# Patient Record
Sex: FEMALE | Race: WHITE | Employment: OTHER | URBAN - METROPOLITAN AREA
[De-identification: names, ages, dates, MRNs, and addresses within clinical notes are randomized per-mention and may not be internally consistent; named-entity substitution may affect disease eponyms.]

---

## 2017-03-31 ENCOUNTER — TELEPHONE (OUTPATIENT)
Dept: OBGYN CLINIC | Facility: CLINIC | Age: 67
End: 2017-03-31

## 2017-03-31 DIAGNOSIS — Z85.3 HISTORY OF RIGHT BREAST CANCER: Primary | ICD-10-CM

## 2017-03-31 NOTE — TELEPHONE ENCOUNTER
Pts last annual 4/22/16. Pt had mammo done on same day as annual last year because it is convenient for her to have mammo and annual done on same day since she lives in [place].  Pt asking if DAWSON can place mammo order now so that she can scheduled mammo on 5/

## 2017-03-31 NOTE — TELEPHONE ENCOUNTER
PT WANT TO KNOW IF SHE CAN GET AN ORDER FOR HER MAMMO TEST ON THE SAME DATE OF HER EXAM WHICH 05/10/17 / BECAUSE OF WHERE SHE LIVE / PT ALSO WANT TO KNOW WHEN HER LAST MAMMO TEST WAS / SHE WOULD LIKE A CALL BACK WITH THAT INFO / PLS ADV

## 2017-04-04 ENCOUNTER — OFFICE VISIT (OUTPATIENT)
Dept: DERMATOLOGY CLINIC | Facility: CLINIC | Age: 67
End: 2017-04-04

## 2017-04-04 DIAGNOSIS — L28.1 PICKER'S NODULES: Primary | ICD-10-CM

## 2017-04-04 DIAGNOSIS — D23.60 BENIGN NEOPLASM OF SKIN OF UPPER LIMB, INCLUDING SHOULDER, UNSPECIFIED LATERALITY: ICD-10-CM

## 2017-04-04 DIAGNOSIS — L82.1 SEBORRHEIC KERATOSES: ICD-10-CM

## 2017-04-04 DIAGNOSIS — D23.30 BENIGN NEOPLASM OF SKIN OF FACE: ICD-10-CM

## 2017-04-04 DIAGNOSIS — Z85.828 PERSONAL HISTORY OF OTHER MALIGNANT NEOPLASM OF SKIN: ICD-10-CM

## 2017-04-04 DIAGNOSIS — L81.4 SOLAR LENTIGO: ICD-10-CM

## 2017-04-04 DIAGNOSIS — D23.4 BENIGN NEOPLASM OF SCALP AND SKIN OF NECK: ICD-10-CM

## 2017-04-04 DIAGNOSIS — D23.70 BENIGN NEOPLASM OF SKIN OF LOWER LIMB, INCLUDING HIP, UNSPECIFIED LATERALITY: ICD-10-CM

## 2017-04-04 DIAGNOSIS — D23.5 BENIGN NEOPLASM OF SKIN OF TRUNK, EXCEPT SCROTUM: ICD-10-CM

## 2017-04-04 PROCEDURE — 17110 DESTRUCTION B9 LES UP TO 14: CPT | Performed by: DERMATOLOGY

## 2017-04-04 PROCEDURE — 99213 OFFICE O/P EST LOW 20 MIN: CPT | Performed by: DERMATOLOGY

## 2017-04-04 NOTE — PROGRESS NOTES
Past Medical History   Diagnosis Date   • H/O partial thyroidectomy      thyroid nodule   • Squamous cell carcinoma ([facility] [address] 2014     left anterior neck   • Cardiomyopathy [facility])      medication   • Breast cancer ([facility] 1991     right mastectomy   • Thyroid nod

## 2017-04-04 NOTE — TELEPHONE ENCOUNTER
ck'g on status of call; pt now lives further; w-like mammo order w-doppler if needed in order have brown't on same day she's coming in for annual  5-10-17  Please advise

## 2017-04-04 NOTE — PROGRESS NOTES
HPI:     Chief Complaint     Skin Cancer        HPI     Skin Cancer    Additional comments: pt here for one year f/u full body exam, h/o SCC       Last edited by Joseph Lloyd RN on 4/4/2017 11:11 AM. (History)          Patient notes some spots on the arm Appendectomy   • History of Papanicolaou smear of cervix 11/20/2013     per NG   • Osteoporosis screening 10/15/2008     per NG   • Menopause      age 35   • Osteopenia      dexa 2001   • Migraine headache    • Cardiovascular disorder 9/2010     ct angio are scattered blotchy brown macules on face, trunk and extremities  - there are  3 areas of erythema and hyperkeratosis with some nodularity on the right forearm and upper arm  - there are some cherry red papules  - there are numerous brown stuck on kerato

## 2017-05-10 ENCOUNTER — OFFICE VISIT (OUTPATIENT)
Dept: OBGYN CLINIC | Facility: CLINIC | Age: 67
End: 2017-05-10

## 2017-05-10 ENCOUNTER — HOSPITAL ENCOUNTER (OUTPATIENT)
Dept: MAMMOGRAPHY | Facility: HOSPITAL | Age: 67
Discharge: HOME | End: 2017-05-10
Attending: OBSTETRICS & GYNECOLOGY
Payer: MEDICARE

## 2017-05-10 VITALS
WEIGHT: 165 LBS | BODY MASS INDEX: 29.23 KG/M2 | DIASTOLIC BLOOD PRESSURE: 74 MMHG | HEIGHT: 63 IN | HEART RATE: 64 BPM | SYSTOLIC BLOOD PRESSURE: 117 MMHG

## 2017-05-10 DIAGNOSIS — Z85.3 HISTORY OF RIGHT BREAST CANCER: ICD-10-CM

## 2017-05-10 DIAGNOSIS — N95.2 ATROPHIC VAGINITIS: ICD-10-CM

## 2017-05-10 DIAGNOSIS — M85.80 OSTEOPENIA, UNSPECIFIED LOCATION: ICD-10-CM

## 2017-05-10 DIAGNOSIS — N95.1 MENOPAUSAL AND FEMALE CLIMACTERIC STATES: Primary | ICD-10-CM

## 2017-05-10 PROCEDURE — 99214 OFFICE O/P EST MOD 30 MIN: CPT | Performed by: OBSTETRICS & GYNECOLOGY

## 2017-05-10 PROCEDURE — 77065 DX MAMMO INCL CAD UNI: CPT | Performed by: OBSTETRICS & GYNECOLOGY

## 2017-05-18 NOTE — PROGRESS NOTES
HPI:    Patient ID: Greg Rivera is a 69year old female. HPI G3 [id number] and . No complaints. Nicholas Garcia moved to [facility]. Sai Barrow and her  had purchased a place in [place]. She's looking for a PCP closer to home.      Review of Systems wheezes. She has no rales. Right breast surgically absent and Left breast without skin / nipple changes, mass, tenderness or axillary adenopathy. Abdominal: Soft. She exhibits no distension and no mass. There is no tenderness.  There is no rebound and

## 2017-12-07 ENCOUNTER — TELEPHONE (OUTPATIENT)
Dept: OBGYN CLINIC | Facility: CLINIC | Age: 67
End: 2017-12-07

## 2017-12-07 NOTE — TELEPHONE ENCOUNTER
PER PT REQUESTING AN ORDER FOR BREST PROSTHESIS / BARS / PT WOULD ALSO LIKE TO SPEAK WITH A NURSE / PLS ADV

## 2017-12-07 NOTE — TELEPHONE ENCOUNTER
Pt calling to report that she needs a rx for right breast prosthesis and rx for 3 bras. Pt stated that she usually gets prothesis from [facility].  Pt stated that RX can be mailed to her home address and she will bring RX to [facility] and they will send info

## 2017-12-13 NOTE — TELEPHONE ENCOUNTER
PT NOTIFIED WE WILL CHECK WITH DAWSON WHILE HE IS IN THE OFFICE THIS AFTERNOON.   SHE NEEDS THE PRESCRIPTION TO SAY RIGHT BREAST PROSTHESIS AND 3 BRAS FOR THE PROSTHETIC.  WAS TOLD BY [facility] THAT AS LONG AS SHE BRINGS IN A PRESCRIPTION MEDICARE WILL PAY F

## 2018-03-01 ENCOUNTER — TELEPHONE (OUTPATIENT)
Dept: OBGYN CLINIC | Facility: CLINIC | Age: 68
End: 2018-03-01

## 2018-03-01 DIAGNOSIS — Z85.3 HISTORY OF BREAST CANCER: Primary | ICD-10-CM

## 2018-03-01 NOTE — TELEPHONE ENCOUNTER
MESSAGE REVIEWED WITH DAWSON OVER THE PHONE AND HE AGREED TO MAMMO ORDER. ORDER PLACED. CALLED AND INFORMED PT'S  THE ORDER IS IN AND THAT PT HAS THE PHONE NUMBER FOR CENTRAL SCHEDULING.

## 2018-03-01 NOTE — TELEPHONE ENCOUNTER
PT LIVES IN [place] SO WOULD LIKE TO SCHEDULE HER MAMMO THE SAME DAY AS HER ANNUAL EXAM ON 5-21-18. NEEDS LEFT BREAST DIAGNOSTIC (ONLY HAS LEFT BREAST DUE TO CANCER). PT SAID WE CAN LEAVE A MESSAGE ON HER VOICE MAIL IF SHE DOES NOT .

## 2018-05-14 ENCOUNTER — OFFICE VISIT (OUTPATIENT)
Dept: DERMATOLOGY CLINIC | Facility: CLINIC | Age: 68
End: 2018-05-14

## 2018-05-14 DIAGNOSIS — L82.1 SEBORRHEIC KERATOSES: ICD-10-CM

## 2018-05-14 DIAGNOSIS — L81.4 SOLAR LENTIGO: ICD-10-CM

## 2018-05-14 DIAGNOSIS — L82.0 INFLAMED SEBORRHEIC KERATOSIS: Primary | ICD-10-CM

## 2018-05-14 DIAGNOSIS — Z85.828 PERSONAL HISTORY OF SKIN CANCER: ICD-10-CM

## 2018-05-14 DIAGNOSIS — D23.70 BENIGN NEOPLASM OF SKIN OF LOWER LIMB, INCLUDING HIP, UNSPECIFIED LATERALITY: ICD-10-CM

## 2018-05-14 DIAGNOSIS — D23.60 BENIGN NEOPLASM OF SKIN OF UPPER LIMB, INCLUDING SHOULDER, UNSPECIFIED LATERALITY: ICD-10-CM

## 2018-05-14 DIAGNOSIS — D23.4 BENIGN NEOPLASM OF SCALP AND SKIN OF NECK: ICD-10-CM

## 2018-05-14 DIAGNOSIS — D23.5 BENIGN NEOPLASM OF SKIN OF TRUNK, EXCEPT SCROTUM: ICD-10-CM

## 2018-05-14 DIAGNOSIS — D23.30 BENIGN NEOPLASM OF SKIN OF FACE: ICD-10-CM

## 2018-05-14 PROCEDURE — 17110 DESTRUCTION B9 LES UP TO 14: CPT | Performed by: DERMATOLOGY

## 2018-05-14 PROCEDURE — 99213 OFFICE O/P EST LOW 20 MIN: CPT | Performed by: DERMATOLOGY

## 2018-05-14 RX ORDER — LEVOTHYROXINE SODIUM 112 UG/1
112 TABLET ORAL
COMMUNITY

## 2018-05-14 NOTE — PROGRESS NOTES
HPI:     Chief Complaint     Lesion        HPI     Lesion    Additional comments: Pt presents today for full body exam. Hx of SCC       Last edited by Alvarez Samson, Julio Cesar Marc on 5/14/2018  3:08 PM. (History)          Patient states that she had something on he • Osteopenia     dexa 2001   • Osteoporosis screening 10/15/2008    per NG   • Squamous cell carcinoma 2014    left anterior neck   • Thyroid nodule     Partial Thyroidectomy;  Thyroid nodule removed 1994     Past Surgical History:  No date: APPENDECTOMY extremities  - there is an inflamed slightly scabbed brown and pink 5 mm keratosis on the outer upper left arm.  - there are some cherry red papules  - there are numerous brown stuck on keratoses.   The lesion that had acted up on the back is now just a lig

## 2018-05-21 ENCOUNTER — OFFICE VISIT (OUTPATIENT)
Dept: OBGYN CLINIC | Facility: CLINIC | Age: 68
End: 2018-05-21

## 2018-05-21 ENCOUNTER — HOSPITAL ENCOUNTER (OUTPATIENT)
Dept: MAMMOGRAPHY | Facility: HOSPITAL | Age: 68
Discharge: HOME | End: 2018-05-21
Attending: OBSTETRICS & GYNECOLOGY
Payer: MEDICARE

## 2018-05-21 VITALS
HEIGHT: 64 IN | DIASTOLIC BLOOD PRESSURE: 77 MMHG | HEART RATE: 68 BPM | BODY MASS INDEX: 28 KG/M2 | WEIGHT: 164 LBS | SYSTOLIC BLOOD PRESSURE: 116 MMHG

## 2018-05-21 DIAGNOSIS — Z01.419 ENCOUNTER FOR GYNECOLOGICAL EXAMINATION WITHOUT ABNORMAL FINDING: Primary | ICD-10-CM

## 2018-05-21 DIAGNOSIS — Z85.3 HISTORY OF BREAST CANCER: ICD-10-CM

## 2018-05-21 PROCEDURE — 77065 DX MAMMO INCL CAD UNI: CPT | Performed by: OBSTETRICS & GYNECOLOGY

## 2018-05-21 PROCEDURE — G0101 CA SCREEN;PELVIC/BREAST EXAM: HCPCS | Performed by: OBSTETRICS & GYNECOLOGY

## 2018-05-28 NOTE — PROGRESS NOTES
HPI:    Patient ID: Emery Severe is a 77year old female. HPI  G3 [id number] and . Review of Systems   Constitutional: Negative for appetite change, fatigue and unexpected weight change. Eyes: Negative for visual disturbance.    Respiratory: Negat Abdominal: Soft. She exhibits no distension and no mass. There is no tenderness. There is no rebound and no guarding. Genitourinary: Vagina normal and uterus normal. No breast discharge. There is no rash or lesion on the right labia.  There is no rash o

## 2019-03-11 ENCOUNTER — TELEPHONE (OUTPATIENT)
Dept: OBGYN CLINIC | Facility: CLINIC | Age: 69
End: 2019-03-11

## 2019-03-11 DIAGNOSIS — Z90.11 HISTORY OF MASTECTOMY, RIGHT: ICD-10-CM

## 2019-03-11 DIAGNOSIS — Z85.3 HISTORY OF RIGHT BREAST CANCER: Primary | ICD-10-CM

## 2019-03-11 NOTE — TELEPHONE ENCOUNTER
Informed pt of DAWSON recs below. Phone number provided to pt to schedule mammo. Pt verbalized understanding.

## 2019-03-11 NOTE — TELEPHONE ENCOUNTER
Pt stated she would like to have mammogram done on the same day as her annual exam with DAWSON. Pt stated that DAWSON \"usually doesn't have a problem with this\". Message to DAWSON if ok to place order for mammo now? Pt is scheduled for annual on 6/17.

## 2019-05-23 ENCOUNTER — OFFICE VISIT (OUTPATIENT)
Dept: DERMATOLOGY CLINIC | Facility: CLINIC | Age: 69
End: 2019-05-23
Payer: MEDICARE

## 2019-05-23 DIAGNOSIS — D23.5 BENIGN NEOPLASM OF SKIN OF TRUNK, EXCEPT SCROTUM: ICD-10-CM

## 2019-05-23 DIAGNOSIS — L81.4 SOLAR LENTIGO: ICD-10-CM

## 2019-05-23 DIAGNOSIS — D23.4 BENIGN NEOPLASM OF SCALP AND SKIN OF NECK: ICD-10-CM

## 2019-05-23 DIAGNOSIS — D23.30 BENIGN NEOPLASM OF SKIN OF FACE: ICD-10-CM

## 2019-05-23 DIAGNOSIS — D23.60 BENIGN NEOPLASM OF SKIN OF UPPER LIMB, INCLUDING SHOULDER, UNSPECIFIED LATERALITY: ICD-10-CM

## 2019-05-23 DIAGNOSIS — Z85.828 PERSONAL HISTORY OF SKIN CANCER: Primary | ICD-10-CM

## 2019-05-23 DIAGNOSIS — L82.0 INFLAMED SEBORRHEIC KERATOSIS: ICD-10-CM

## 2019-05-23 DIAGNOSIS — D23.70 BENIGN NEOPLASM OF SKIN OF LOWER LIMB, INCLUDING HIP, UNSPECIFIED LATERALITY: ICD-10-CM

## 2019-05-23 DIAGNOSIS — L82.1 SEBORRHEIC KERATOSES: ICD-10-CM

## 2019-05-23 PROCEDURE — 99213 OFFICE O/P EST LOW 20 MIN: CPT | Performed by: DERMATOLOGY

## 2019-05-23 PROCEDURE — 17110 DESTRUCTION B9 LES UP TO 14: CPT | Performed by: DERMATOLOGY

## 2019-05-23 PROCEDURE — G0463 HOSPITAL OUTPT CLINIC VISIT: HCPCS | Performed by: DERMATOLOGY

## 2019-05-23 NOTE — PROGRESS NOTES
HPI:     Chief Complaint     Full Skin Exam        HPI     Full Skin Exam      Additional comments: LOV 5/14/2018. Pt presenting for full body skin exam. Pt has a personal hx of SCC to L anterior neck.            Last edited by Bailey Renteria LPN on 6/45 Cholecystectomy   • H/O partial thyroidectomy     thyroid nodule   • History of Papanicolaou smear of cervix 11/20/2013    per NG   • Lipid screening 3/19/2011    per NG   • Menopause     age 35   • Migraine headache    • Osteopenia     dexa 2001   • Osteo organizations: Not on file        Relationship status: Not on file      Intimate partner violence:        Fear of current or ex partner: Not on file        Emotionally abused: Not on file        Physically abused: Not on file        Forced sexual activity: trunk and extremities  - there is a cluster of 3 pink keratotic somewhat lichenified papules encompassing a 1 cm area on right forearm  - there are some cherry red papules  - there are numerous brown stuck on keratoses.     ASSESSMENT/PLAN:   Personal histo

## 2019-05-23 NOTE — PATIENT INSTRUCTIONS
If lesions on right arms are not gone in 6 weeks please make appointment for follow-up.   Otherwise continue once yearly exams

## 2019-05-24 ENCOUNTER — TELEPHONE (OUTPATIENT)
Dept: DERMATOLOGY CLINIC | Facility: CLINIC | Age: 69
End: 2019-05-24

## 2019-05-24 NOTE — TELEPHONE ENCOUNTER
Pt has a question regarding swimming with her spot she had frozen a couple of days ago. . pls call to discuss

## 2019-05-24 NOTE — TELEPHONE ENCOUNTER
It's fine to go in pool at any time.   Advise pt that if the blisters open that she should apply vaseline to speed healing

## 2019-06-17 ENCOUNTER — HOSPITAL ENCOUNTER (OUTPATIENT)
Dept: ULTRASOUND IMAGING | Facility: HOSPITAL | Age: 69
Discharge: HOME | End: 2019-06-17
Attending: OBSTETRICS & GYNECOLOGY
Payer: MEDICARE

## 2019-06-17 ENCOUNTER — HOSPITAL ENCOUNTER (OUTPATIENT)
Dept: MAMMOGRAPHY | Facility: HOSPITAL | Age: 69
Discharge: HOME | End: 2019-06-17
Attending: OBSTETRICS & GYNECOLOGY
Payer: MEDICARE

## 2019-06-17 ENCOUNTER — OFFICE VISIT (OUTPATIENT)
Dept: OBGYN CLINIC | Facility: CLINIC | Age: 69
End: 2019-06-17
Payer: MEDICARE

## 2019-06-17 VITALS
SYSTOLIC BLOOD PRESSURE: 124 MMHG | HEIGHT: 64 IN | BODY MASS INDEX: 27.83 KG/M2 | HEART RATE: 64 BPM | DIASTOLIC BLOOD PRESSURE: 84 MMHG | WEIGHT: 163 LBS

## 2019-06-17 DIAGNOSIS — Z85.3 HISTORY OF RIGHT BREAST CANCER: ICD-10-CM

## 2019-06-17 DIAGNOSIS — N95.1 MENOPAUSAL AND FEMALE CLIMACTERIC STATES: Primary | ICD-10-CM

## 2019-06-17 DIAGNOSIS — M85.852 OSTEOPENIA OF LEFT FEMORAL NECK: ICD-10-CM

## 2019-06-17 DIAGNOSIS — Z90.11 HISTORY OF MASTECTOMY, RIGHT: ICD-10-CM

## 2019-06-17 DIAGNOSIS — N95.2 ATROPHIC VAGINITIS: ICD-10-CM

## 2019-06-17 PROCEDURE — 76642 ULTRASOUND BREAST LIMITED: CPT | Performed by: OBSTETRICS & GYNECOLOGY

## 2019-06-17 PROCEDURE — 77065 DX MAMMO INCL CAD UNI: CPT | Performed by: OBSTETRICS & GYNECOLOGY

## 2019-06-17 PROCEDURE — 99214 OFFICE O/P EST MOD 30 MIN: CPT | Performed by: OBSTETRICS & GYNECOLOGY

## 2019-06-17 PROCEDURE — 77061 BREAST TOMOSYNTHESIS UNI: CPT | Performed by: OBSTETRICS & GYNECOLOGY

## 2019-06-17 RX ORDER — PROMETHAZINE HYDROCHLORIDE AND CODEINE PHOSPHATE 6.25; 1 MG/5ML; MG/5ML
5 SYRUP ORAL
COMMUNITY
Start: 2019-06-06 | End: 2019-06-30

## 2019-06-17 RX ORDER — PROMETHAZINE HYDROCHLORIDE AND CODEINE PHOSPHATE 6.25; 1 MG/5ML; MG/5ML
SOLUTION ORAL
Refills: 0 | COMMUNITY
Start: 2019-06-06 | End: 2020-08-03

## 2019-06-17 RX ORDER — MULTIVIT-MIN/IRON/FOLIC ACID/K 18-600-40
CAPSULE ORAL
COMMUNITY

## 2019-06-17 RX ORDER — HYDROCODONE BITARTRATE AND GUAIFENESIN 2.5; 2 MG/5ML; MG/5ML
5 SOLUTION ORAL
COMMUNITY
Start: 2019-06-06 | End: 2020-08-03

## 2019-06-27 PROBLEM — Z85.3 HISTORY OF RIGHT BREAST CANCER: Status: ACTIVE | Noted: 2019-06-27

## 2019-06-28 NOTE — PROGRESS NOTES
HPI:    Patient ID: Kennedi Sanchez is a 68year old female. HPI G3 [id number] and . She has several issues. One is hx of osteopenia and currently using Vitamin D but no bisphosphonate. She is due for repeat Dexa. She does experience vaginal irritation. Comment:Other reaction(s): Rash   PHYSICAL EXAM:   Physical Exam   Constitutional: She appears well-developed and well-nourished. No distress. Neck: Neck supple. No thyromegaly present.    Cardiovascular: Normal rate, regular rhythm and normal heart sound Referrals:  None       #[id number]

## 2020-05-19 ENCOUNTER — TELEPHONE (OUTPATIENT)
Dept: OBGYN CLINIC | Facility: CLINIC | Age: 70
End: 2020-05-19

## 2020-05-19 DIAGNOSIS — Z12.31 BREAST CANCER SCREENING BY MAMMOGRAM: Primary | ICD-10-CM

## 2020-05-19 DIAGNOSIS — Z85.3 HISTORY OF CANCER OF RIGHT BREAST: ICD-10-CM

## 2020-05-19 NOTE — TELEPHONE ENCOUNTER
Last annual= 6/17/2019   Next annual=8/3/2020    Last mammo= 6/17/2019 left breast;benign    Message to DAWSON.     DAWSON-okay for mammo

## 2020-08-03 ENCOUNTER — HOSPITAL ENCOUNTER (OUTPATIENT)
Dept: MAMMOGRAPHY | Facility: HOSPITAL | Age: 70
Discharge: HOME | End: 2020-08-03
Attending: OBSTETRICS & GYNECOLOGY
Payer: MEDICARE

## 2020-08-03 ENCOUNTER — OFFICE VISIT (OUTPATIENT)
Dept: OBGYN CLINIC | Facility: CLINIC | Age: 70
End: 2020-08-03
Payer: MEDICARE

## 2020-08-03 VITALS
SYSTOLIC BLOOD PRESSURE: 144 MMHG | DIASTOLIC BLOOD PRESSURE: 81 MMHG | HEART RATE: 62 BPM | WEIGHT: 164 LBS | BODY MASS INDEX: 28 KG/M2

## 2020-08-03 DIAGNOSIS — Z01.419 ENCOUNTER FOR GYNECOLOGICAL EXAMINATION WITHOUT ABNORMAL FINDING: Primary | ICD-10-CM

## 2020-08-03 DIAGNOSIS — Z12.31 SCREENING MAMMOGRAM FOR HIGH-RISK PATIENT: ICD-10-CM

## 2020-08-03 DIAGNOSIS — Z85.3 HISTORY OF CANCER OF RIGHT BREAST: ICD-10-CM

## 2020-08-03 PROCEDURE — G0101 CA SCREEN;PELVIC/BREAST EXAM: HCPCS | Performed by: OBSTETRICS & GYNECOLOGY

## 2020-08-03 PROCEDURE — 77065 DX MAMMO INCL CAD UNI: CPT | Performed by: OBSTETRICS & GYNECOLOGY

## 2020-08-03 PROCEDURE — 77061 BREAST TOMOSYNTHESIS UNI: CPT | Performed by: OBSTETRICS & GYNECOLOGY

## 2020-08-10 NOTE — PROGRESS NOTES
HPI:    Patient ID: Klarissa Boyle is a 65year old female. HPI  G3 [id number] and . No new personal or family medical issues. She is S/P right mastectomy many years ago. No recurrent disease. She is walking for exercise.  Mena Cardoza and Vladimir's kids: Kelly Geller Cardiovascular: Normal rate, regular rhythm and normal heart sounds. No murmur heard. Pulmonary/Chest: Effort normal and breath sounds normal. She has no wheezes. She has no rales. No breast discharge. Abdominal: Soft.  She exhibits no distensio

## 2020-09-29 ENCOUNTER — OFFICE VISIT (OUTPATIENT)
Dept: DERMATOLOGY CLINIC | Facility: CLINIC | Age: 70
End: 2020-09-29
Payer: MEDICARE

## 2020-09-29 DIAGNOSIS — L82.1 SEBORRHEIC KERATOSES: ICD-10-CM

## 2020-09-29 DIAGNOSIS — D22.9 MULTIPLE BENIGN NEVI: ICD-10-CM

## 2020-09-29 DIAGNOSIS — L82.0 INFLAMED SEBORRHEIC KERATOSIS: ICD-10-CM

## 2020-09-29 DIAGNOSIS — L28.1 PICKER'S NODULE: ICD-10-CM

## 2020-09-29 DIAGNOSIS — L81.4 SOLAR LENTIGO: ICD-10-CM

## 2020-09-29 DIAGNOSIS — Z85.828 PERSONAL HISTORY OF SKIN CANCER: Primary | ICD-10-CM

## 2020-09-29 DIAGNOSIS — D18.01 HEMANGIOMA OF SKIN AND SUBCUTANEOUS TISSUE: ICD-10-CM

## 2020-09-29 PROCEDURE — 17110 DESTRUCTION B9 LES UP TO 14: CPT | Performed by: DERMATOLOGY

## 2020-09-29 PROCEDURE — G0463 HOSPITAL OUTPT CLINIC VISIT: HCPCS | Performed by: DERMATOLOGY

## 2020-09-29 PROCEDURE — 99213 OFFICE O/P EST LOW 20 MIN: CPT | Performed by: DERMATOLOGY

## 2020-09-29 NOTE — PROGRESS NOTES
HPI:     Chief Complaint     Full Skin Exam        HPI     Full Skin Exam      Additional comments: LOV 5/23/2019 Patient present for full body skin exam . Patient has hx of SCC          Last edited by Julio Cesar Torres on 9/29/2020  1:54 PM. (History) Medical History:   Diagnosis Date   • Appendicitis     Appendectomy   • Breast cancer [facility]) 1991    right mastectomy   • Cardiomyopathy [facility])     medication   • Cardiovascular disorder 9/2010    ct angio   • Cholecystitis     Cholecystectomy   • H/O [place] Social connections        Talks on phone: Not on file        Gets together: Not on file        Attends Mosque service: Not on file        Active member of club or organization: Not on file        Attends meetings of clubs or organizations: Not on file distress    The exam was remarkable for the following:  - there is no evidence of recurrent squamous cell carcinoma from the neck  - melanocytic nevi are uniform in color, shape and borders.   -there are scattered blotchy brown macules on face, trunk and e discussed    Orders Placed This Encounter      dest benign <15      Results From Past 48 Hours:  No results found for this or any previous visit (from the past 48 hour(s)).     Meds This Visit:      Imaging Orders:  None     Referral Orders:  No orders of t

## 2020-09-30 ENCOUNTER — TELEPHONE (OUTPATIENT)
Dept: DERMATOLOGY CLINIC | Facility: CLINIC | Age: 70
End: 2020-09-30

## 2020-09-30 NOTE — TELEPHONE ENCOUNTER
Patient [id number]-[phone]    Can patient use a needle to pop the blister on arm? States large blister and feels tight. Also if she does pop it, can she use a bandaid to cover it?  Please call     LOV 9/29/2020

## 2020-09-30 NOTE — TELEPHONE ENCOUNTER
S/w pt - pt states she had a lesion frozen to right forearm yesterday and developed a \"tight\" blister - wanted to confirm that she may puncture the side of it with sterile needle - pt informed that she may and to wash id daily soap/water, apply polyspori

## (undated) NOTE — MR AVS SNAPSHOT
[facility] [place] [address] [address] [id number] [phone]               Thank you for choosing us for your health care visit with Juana Montilla MD.  We are glad to serve you and happy to provide you with this summary of y Take  by mouth. Metoprolol Succinate  MG Tb24   Take  by mouth. Commonly known as: Toprol XL           ONE-DAILY MULTI VITAMINS Tabs   Take 1 tablet by mouth daily.                    Myer     Sign up for Myer, your secure online me HOW TO GET STARTED: HOW TO STAY MOTIVATED:   Start activities slowly and build up over time Do what you like   Get your heart pumping – brisk walking, biking, swimming Even 10 minute increments are effective and add up over the week   2 ½ hours per week –

## (undated) NOTE — MR AVS SNAPSHOT
[place]  [address]  [phone]               Thank you for choosing us for your health care visit with Bhupendra Clement. DO Rakel.   We are glad to serve you and happy to provide you with this summ You can access your MyChart to more actively manage your health care and view more details from this visit by going to [email or web address].   If you've recently had a stay at the Hospital you can access your discharge instructions in [address] by starr